# Patient Record
Sex: FEMALE | Race: WHITE | NOT HISPANIC OR LATINO | URBAN - NONMETROPOLITAN AREA
[De-identification: names, ages, dates, MRNs, and addresses within clinical notes are randomized per-mention and may not be internally consistent; named-entity substitution may affect disease eponyms.]

---

## 2023-05-26 ENCOUNTER — OFFICE VISIT (OUTPATIENT)
Dept: URBAN - NONMETROPOLITAN AREA CLINIC 2 | Facility: CLINIC | Age: 58
End: 2023-05-26

## 2023-06-09 ENCOUNTER — OFFICE VISIT (OUTPATIENT)
Dept: URBAN - NONMETROPOLITAN AREA CLINIC 2 | Facility: CLINIC | Age: 58
End: 2023-06-09

## 2023-06-21 ENCOUNTER — LAB OUTSIDE AN ENCOUNTER (OUTPATIENT)
Dept: URBAN - NONMETROPOLITAN AREA CLINIC 2 | Facility: CLINIC | Age: 58
End: 2023-06-21

## 2023-06-21 ENCOUNTER — OFFICE VISIT (OUTPATIENT)
Dept: URBAN - NONMETROPOLITAN AREA CLINIC 2 | Facility: CLINIC | Age: 58
End: 2023-06-21
Payer: COMMERCIAL

## 2023-06-21 ENCOUNTER — WEB ENCOUNTER (OUTPATIENT)
Dept: URBAN - NONMETROPOLITAN AREA CLINIC 2 | Facility: CLINIC | Age: 58
End: 2023-06-21

## 2023-06-21 ENCOUNTER — TELEPHONE ENCOUNTER (OUTPATIENT)
Dept: URBAN - NONMETROPOLITAN AREA CLINIC 2 | Facility: CLINIC | Age: 58
End: 2023-06-21

## 2023-06-21 VITALS
SYSTOLIC BLOOD PRESSURE: 121 MMHG | HEART RATE: 84 BPM | DIASTOLIC BLOOD PRESSURE: 85 MMHG | BODY MASS INDEX: 29.06 KG/M2 | WEIGHT: 164 LBS | HEIGHT: 63 IN

## 2023-06-21 DIAGNOSIS — K59.09 CHRONIC CONSTIPATION: ICD-10-CM

## 2023-06-21 DIAGNOSIS — Z86.010 PERSONAL HISTORY OF COLONIC POLYPS: ICD-10-CM

## 2023-06-21 DIAGNOSIS — K31.84 GASTROPARESIS: ICD-10-CM

## 2023-06-21 PROCEDURE — 99203 OFFICE O/P NEW LOW 30 MIN: CPT | Performed by: INTERNAL MEDICINE

## 2023-06-21 RX ORDER — LISINOPRIL 20 MG/1
TAKE 1 TABLET (20 MG) BY ORAL ROUTE ONCE DAILY TABLET ORAL 1
Qty: 0 | Refills: 0 | Status: ACTIVE | COMMUNITY
Start: 1900-01-01

## 2023-06-21 RX ORDER — ROSUVASTATIN CALCIUM 10 MG/1
TAKE 1 TABLET (10 MG) BY ORAL ROUTE ONCE DAILY AT BEDTIME TABLET, FILM COATED ORAL 1
Qty: 0 | Refills: 0 | Status: ACTIVE | COMMUNITY
Start: 1900-01-01

## 2023-06-21 NOTE — HPI-TODAY'S VISIT:
Ms. Blanton returns to re-establish care for gastroparesis and personal history of colon polyps.  She was last seen 5/1/19 and was doing well on Reglan at that time.  She was off PPI.  Her last colonoscopy was in early 2018 with one hyperplastic polyp.  Today she reports that she is doing very well with her gastroparesis.  She is actually been visiting "[facility]" in [place] and she is off Reglan now and only taking Linzess 145 daily for constipation.  She is moving her bowels daily.  Appetite is much better.  She feels overall improved from years past.

## 2023-08-11 ENCOUNTER — WEB ENCOUNTER (OUTPATIENT)
Dept: URBAN - NONMETROPOLITAN AREA SURGERY CENTER 1 | Facility: SURGERY CENTER | Age: 58
End: 2023-08-11

## 2023-08-17 ENCOUNTER — OFFICE VISIT (OUTPATIENT)
Dept: URBAN - NONMETROPOLITAN AREA SURGERY CENTER 1 | Facility: SURGERY CENTER | Age: 58
End: 2023-08-17
Payer: COMMERCIAL

## 2023-08-17 DIAGNOSIS — Z86.010 ADENOMAS PERSONAL HISTORY OF COLONIC POLYPS: ICD-10-CM

## 2023-08-17 DIAGNOSIS — Z12.11 COLON CANCER SCREENING: ICD-10-CM

## 2023-08-17 PROCEDURE — G8907 PT DOC NO EVENTS ON DISCHARG: HCPCS | Performed by: INTERNAL MEDICINE

## 2023-08-17 PROCEDURE — G0105 COLORECTAL SCRN; HI RISK IND: HCPCS | Performed by: INTERNAL MEDICINE

## 2024-04-16 ENCOUNTER — OV EP (OUTPATIENT)
Dept: URBAN - NONMETROPOLITAN AREA CLINIC 2 | Facility: CLINIC | Age: 59
End: 2024-04-16
Payer: COMMERCIAL

## 2024-04-16 VITALS
BODY MASS INDEX: 30.83 KG/M2 | TEMPERATURE: 98.3 F | DIASTOLIC BLOOD PRESSURE: 72 MMHG | HEIGHT: 63 IN | SYSTOLIC BLOOD PRESSURE: 106 MMHG | HEART RATE: 83 BPM | WEIGHT: 174 LBS

## 2024-04-16 DIAGNOSIS — K59.09 CHRONIC CONSTIPATION: ICD-10-CM

## 2024-04-16 DIAGNOSIS — Z86.010 PERSONAL HISTORY OF COLONIC POLYPS: ICD-10-CM

## 2024-04-16 DIAGNOSIS — K31.84 GASTROPARESIS: ICD-10-CM

## 2024-04-16 PROCEDURE — 99214 OFFICE O/P EST MOD 30 MIN: CPT | Performed by: NURSE PRACTITIONER

## 2024-04-16 RX ORDER — ROSUVASTATIN CALCIUM 10 MG/1
TAKE 1 TABLET (10 MG) BY ORAL ROUTE ONCE DAILY AT BEDTIME TABLET, FILM COATED ORAL 1
Qty: 0 | Refills: 0 | Status: ACTIVE | COMMUNITY
Start: 1900-01-01

## 2024-04-16 RX ORDER — METOCLOPRAMIDE 5 MG/1
1 TABLET BEFORE MEALS TABLET ORAL
Qty: 120 TABLET | Refills: 5 | OUTPATIENT
Start: 2024-04-16

## 2024-04-16 RX ORDER — INSULIN GLARGINE 300 U/ML
INJECT 34 UNITS ONCE A DAY AT BEDTIME FOR 30 DAYS INJECTION, SOLUTION SUBCUTANEOUS
Qty: 6 MILLILITER | Refills: 0 | Status: ACTIVE | COMMUNITY

## 2024-04-16 RX ORDER — ROSUVASTATIN CALCIUM 20 MG/1
TAKE 1 TABLET BY MOUTH EVERY DAY TABLET, FILM COATED ORAL
Qty: 30 EACH | Refills: 4 | Status: ACTIVE | COMMUNITY

## 2024-04-16 RX ORDER — VENLAFAXINE 75 MG/1
TAKE 2 TABLETS BY MOUTH DAILY WITH FOOD FOR DEPRESSION TABLET ORAL
Qty: 60 EACH | Refills: 1 | Status: ACTIVE | COMMUNITY

## 2024-04-16 RX ORDER — SEMAGLUTIDE 1.34 MG/ML
INJECT 1MG UNDER THE SKIN ONCE WEEKLY AS DIRECTED INJECTION, SOLUTION SUBCUTANEOUS
Qty: 3 MILLILITER | Refills: 0 | Status: ACTIVE | COMMUNITY

## 2024-04-16 RX ORDER — METOPROLOL SUCCINATE 25 MG/1
TAKE 1 TABLET BY MOUTH TWICE DAILY TABLET, EXTENDED RELEASE ORAL
Qty: 60 EACH | Refills: 1 | Status: ACTIVE | COMMUNITY

## 2024-04-16 RX ORDER — LISINOPRIL 20 MG/1
TAKE 1 TABLET (20 MG) BY ORAL ROUTE ONCE DAILY TABLET ORAL 1
Qty: 0 | Refills: 0 | Status: ON HOLD | COMMUNITY
Start: 1900-01-01

## 2024-04-16 NOTE — PHYSICAL EXAM SKIN:
no rashes , no suspicious lesions patient ambulatory to triage c/o fever, dizziness, vomiting x1 day. patient given 5ml of tylenol 2hours pta. -allergies

## 2024-04-16 NOTE — HPI-TODAY'S VISIT:
Ms. Mayenigree returns or gastroparesis and personal history of colon polyps. She is actually been visiting "[facility]" in [place]. was doing well, but trulicity was dropped by insurance and she was swapped to ozempic. now, n/v. somewhat better this week. takes prn linzess for constipation. sb

## 2024-05-31 ENCOUNTER — OFFICE VISIT (OUTPATIENT)
Dept: URBAN - NONMETROPOLITAN AREA CLINIC 2 | Facility: CLINIC | Age: 59
End: 2024-05-31

## 2024-06-11 ENCOUNTER — OFFICE VISIT (OUTPATIENT)
Dept: URBAN - NONMETROPOLITAN AREA CLINIC 2 | Facility: CLINIC | Age: 59
End: 2024-06-11

## 2024-07-12 ENCOUNTER — OFFICE VISIT (OUTPATIENT)
Dept: URBAN - NONMETROPOLITAN AREA CLINIC 2 | Facility: CLINIC | Age: 59
End: 2024-07-12